# Patient Record
Sex: MALE | Race: WHITE
[De-identification: names, ages, dates, MRNs, and addresses within clinical notes are randomized per-mention and may not be internally consistent; named-entity substitution may affect disease eponyms.]

---

## 2024-01-16 ENCOUNTER — APPOINTMENT (OUTPATIENT)
Dept: PHYSICAL MEDICINE AND REHAB | Facility: CLINIC | Age: 74
End: 2024-01-16
Payer: COMMERCIAL

## 2024-01-16 VITALS
SYSTOLIC BLOOD PRESSURE: 102 MMHG | WEIGHT: 163 LBS | DIASTOLIC BLOOD PRESSURE: 65 MMHG | BODY MASS INDEX: 24.71 KG/M2 | RESPIRATION RATE: 18 BRPM | HEIGHT: 68 IN | HEART RATE: 71 BPM

## 2024-01-16 DIAGNOSIS — M79.646 PAIN IN UNSPECIFIED FINGER(S): ICD-10-CM

## 2024-01-16 DIAGNOSIS — R27.8 OTHER LACK OF COORDINATION: ICD-10-CM

## 2024-01-16 DIAGNOSIS — R29.898 OTHER SYMPTOMS AND SIGNS INVOLVING THE MUSCULOSKELETAL SYSTEM: ICD-10-CM

## 2024-01-16 DIAGNOSIS — Z78.9 OTHER SPECIFIED HEALTH STATUS: ICD-10-CM

## 2024-01-16 PROCEDURE — 99204 OFFICE O/P NEW MOD 45 MIN: CPT

## 2024-01-16 RX ORDER — SILDENAFIL CITRATE 100 MG/1
100 TABLET, FILM COATED ORAL
Refills: 0 | Status: ACTIVE | COMMUNITY

## 2024-01-16 RX ORDER — FINASTERIDE 1 MG/1
TABLET ORAL
Refills: 0 | Status: ACTIVE | COMMUNITY

## 2024-01-16 RX ORDER — TERAZOSIN HCL 10 MG
CAPSULE ORAL
Refills: 0 | Status: ACTIVE | COMMUNITY

## 2024-01-16 NOTE — ASSESSMENT
[FreeTextEntry1] :   PLAN AND RECOMMENDATIONS :   We discussed differential diagnosis and clinical impression  agree with EMG rule out ulnar neuropathy or CTS  Recommend .symptomatic care and support  medications NA   imaging done by ortho    hydrotherapy /heat / cold for pain  continue  ergonomic precautions including pacing ,posture and frequent breaks while typing.  relative rest and avoidance of painful activity where possible  increasing activity as discussed  return for EMG  Information given to patient about EMG and Nerve Conduction Study Examination including  planning, differential diagnosis to rule in /rule out ,duration of the test ,precautions (if patient on blood thinner.has bleeding disorder or  pace maker device etc -still possible to undergo with care), side effects(benign-limited to short term bruising and discomfort/pain)   The protocol of temp checks upon arrival ,disinfection procedure of waiting room and the lab explained- reassured.  All questions answered.  Patient instructed to book appointment upon conclusion of appointment  Information sheet ' Answers to your Questions on EMG " forwarded to patient to read prior to testing, with further information about training,background and the procedure itself .

## 2024-01-16 NOTE — HISTORY OF PRESENT ILLNESS
[FreeTextEntry1] :  Mr. CE MELLO is a very pleasant [facility] 73-year male who seen for evaluation of R thumb that has been ongoing for 1 month without any specific injury or inciting event. The pain is located primarily R thumb finger intermittent in nature and described as weak. The pain is rated as 0/10 during today's visit, and ranges from 0/10 - he doesn't feel any pain, fingers  just weak and lack power. The patient's symptoms are aggravated by bending and alleviated by nothing. The patient denies any night pain, numbness/tingling, weakness, or bowel/bladder dysfunction. The patient has no other complaints at this time. he is a practicing  transactional full time  has no neck pain  he cannot unscrew jars

## 2024-01-16 NOTE — PHYSICAL EXAM
[FreeTextEntry1] : PHYSICAL EXAM : OBJECTIVE   GENERAL : Awake ,alert and oriented to time place and person  HEAD : normocephalic and atraumatic  NECK : supple ,no tracheal deviation ,no thyroid enlargement noted with swallowing EYES : sclera and conjunctiva normal no redness,intact extraocular movements  ENT  : ears and nose normal in appearance -hearing adequate  PULMONARY: effort normal. No respiratory distress. breathing regular. No wheezes  LYMPH : No swelling in limbs, capillary return within normal range  CVS : warm extremities,no palpitations,not short of breath, no visible jugular venous distention PSYCH : mood and affect normal ,good eye contact ,normal attention  ABDOMEN : no visible distension ,  NEUROLOGICAL:cranial nerves intact,muscle tone normal,gait and balance safe except where noted below  SKIN : warm and dry No rash detected over specific body areas examined  MUSCULOSKELETAL: normal muscle bulk, no focal bony tenderness /posture normal except where specified below  grasp 5/5  weak PAD and DAB  no wasting   cspine ROM full  No long tract signs found on clinical exam and no focal neurological deficits deltoid biceps triceps wrist extensors 5/5 sensation u\intact

## 2024-01-16 NOTE — CONSULT LETTER
[FreeTextEntry1] : Dear Dr. AUGUSTA OLVERA  ,   I had the pleasure of evaluating your patient, CE MELLO .  Thank you very much for allowing me to participate in the care of this patient. If you have any questions, please do not hesitate to contact me.  Nice to hear of you and that any [facility] doctor still in practice  cheers  Sincerely,  Param Phan MD  my cell is   ABPMR Board Certified in Physical Medicine and Rehabilitation Certified Fellow of AANEM (Neuromuscular and Electrodiagnostic Medicine) Subspecialty certified in Sports Medicine (ABPMR)   of Physical Medicine and Rehabilitation [facility] [employer] [facility]

## 2024-01-16 NOTE — REASON FOR VISIT
[Initial Evaluation] : an initial evaluation [FreeTextEntry1] : for R thumb referred by Dr. Gutierrez

## 2024-01-16 NOTE — REVIEW OF SYSTEMS
[Patient Intake Form Reviewed] : Patient intake form was reviewed [Fever] : no fever [Chills] : no chills [Joint Pain] : no joint pain [Muscle Weakness] : muscle weakness

## 2024-01-23 ENCOUNTER — APPOINTMENT (OUTPATIENT)
Dept: PHYSICAL MEDICINE AND REHAB | Facility: CLINIC | Age: 74
End: 2024-01-23
Payer: COMMERCIAL

## 2024-01-23 DIAGNOSIS — G56.03 CARPAL TUNNEL SYNDROM,BILATERAL UPPER LIMBS: ICD-10-CM

## 2024-01-23 PROCEDURE — 95886 MUSC TEST DONE W/N TEST COMP: CPT

## 2024-01-23 PROCEDURE — 95911 NRV CNDJ TEST 9-10 STUDIES: CPT

## 2024-01-23 NOTE — PROCEDURE
[de-identified] : EMG /NERVE CONDUCTION STUDY performed today without complication  Tabulated data, wave forms , conclusions and recommendations are attached and in the procedure report.  Please refer to the scanned study attached to this encounter  Full history and focused clinical exam performed prior to the examination and documented in report

## 2024-04-02 ENCOUNTER — APPOINTMENT (OUTPATIENT)
Dept: UROLOGY | Facility: CLINIC | Age: 74
End: 2024-04-02
Payer: COMMERCIAL

## 2024-04-02 VITALS
SYSTOLIC BLOOD PRESSURE: 104 MMHG | DIASTOLIC BLOOD PRESSURE: 65 MMHG | OXYGEN SATURATION: 96 % | HEART RATE: 73 BPM | TEMPERATURE: 97.5 F

## 2024-04-02 DIAGNOSIS — R35.0 FREQUENCY OF MICTURITION: ICD-10-CM

## 2024-04-02 PROCEDURE — 99204 OFFICE O/P NEW MOD 45 MIN: CPT

## 2024-04-02 RX ORDER — SILDENAFIL 100 MG/1
100 TABLET, FILM COATED ORAL
Qty: 6 | Refills: 4 | Status: ACTIVE | COMMUNITY
Start: 2024-04-02 | End: 1900-01-01

## 2024-04-02 RX ORDER — SILODOSIN 8 MG/1
8 CAPSULE ORAL DAILY
Qty: 90 | Refills: 3 | Status: ACTIVE | COMMUNITY
Start: 2024-04-02 | End: 1900-01-01

## 2024-04-02 NOTE — HISTORY OF PRESENT ILLNESS
[FreeTextEntry1] : Language: English Date of First visit: 2024 Accompanied by: Self Contact info: *** Referring Provider/PCP: Tony Garrison [phone]        CC/ Problem List:   =============================================================================== FIRST VISIT: The patient has been a private practice patient of Dr. Fred Sparrow Prior paper chart WAS available for this visit. Any Prior paper chart is scanned into the fitaborate system. Please see admin   The patient's first visit with [facility] urology was on 2024 when the patient was 73 years old   He has been seen by Dr. Sparrow since . He first c/o urinary frequency, weak stream, difficulty with erections. He was put on Hytrin 2mg. In  he was also given Yohimbine and Hytrin 4mg. He had a prostate nodule in 1998 and recommended a Pbx. He was then put on Viagra in . By  he was on Hytrin 4mg, Viagra 50mg and Yohimbine. In  he c/o low libido and was given T in the office 1.5cc. He was switched to Levitra in . In 2005 T was stopped because the pt was not sexually active. His prostate was deemed to be 30gm (unclear method) but he was put on Avodart in . His Hytrin was increased to 10mg but his Avodart was stopped . He had a UTI in . He was kept on Hytrin and Viagra. Around the same time he stopped Taking Viagra. In  finasteride was added.   ------------------------------------------------------------------------------------------- INTERVAL VISITS:   The patient's age today 2024  is 73 year old. Please note interval events and changes in PMH, PSH, MEDS and ALLERGIES were reviewed. He has some frequency. It is not worse. It is a bothersome. He denies straining to void. HIs stream is weak. He has nocturia 2-3x/night. He does not feel empty after voiding. He has some mild constipation. He denies SE's with Hytrin. He has low BP so he has to be careful with the Viagra. He knows when it is interacting and wants to continue it.   ===============================================================================   PMH: Right thumb, Skin problem PSH: Right knee MCL and meniscus, tonsils; Varicocele POBH: (if applicable) FH:   ALL: Sulfa- last had it with 3yo MEDS: Hytrin 10mg, Finasteride 5mg, Viagra, Elidel, No vitamins SOC: No Tob, Social ETOH, no drugs     ROS: Review of Systems is as per HPI unless otherwise denoted below     =============================================================================== DATA:   LABS:-------------------------------------------------------------------------------------------------------------------  All PSAs from [id number] were <1 2007: T done at 1:30pm: T 230, Free T 5.8 2013: PSA 1.1 2015: PSA 1.1 2017: PSA 1.2 2019: PSA 0.6 2020: PSA 0.6 10/8/2022: PSA 0.6 2017: Urine enterococcus R: Tet     RADS:-------------------------------------------------------------------------------------------------------------------  2021: RBUS Kidneys normal. RLP cyst 3cm Nonshadowing echogenic focus in left kidney 6mm Bladder normal with BL jets, PVR 80cc, Prostate 24gm     PATHOLOGY/CYTOLOGY:-------------------------------------------------------------------------------------------  1998: Prostate biopsy Five cores of prostatic adenomyomatous Hyperplasia     VOIDING STUDIES: ----------------------------------------------------------------------------------------------------  2024: PVR 112cc     STONE STUDIES: (Analysis/LLSA)----------------------------------------------------------------------------------       PROCEDURES: -----------------------------------------------------------------------------------------------         ===============================================================================    PHYSICAL EXAM:  GEN: AAOx3, NAD; Habitus: normal  BARRIERS to CARE: none  PSYCH: Appropriate Behavior, Affect Congruent  HEENT: AT/NC Trachea midline. EOMI.  Lungs: No labored breathing.  NEURO: + Movement, all 4 extremities grossly intact without deficits. No tremors.  SKIN: Warm dry. No visible rashes or ulcers  GAIT: Gait normal, Stability good  =======================================================================================      ASSESSMENT and PLAN   The patient is a 73 year male with a history of the followin. BPH with some frequency, moderate PVR, and ED. He Gets faint on occasion with Viagra and Hytrin 10mg. He likes using the Viagra. He still has frequency. He tends toward hypotension I proposed the following plan for him: a. Change his hytrin to Silodosin which is safe for people with sulfa allergies b. Two weeks later stop finasteride. This likely isn't helping given his small prostate and may be contributing to his ED c. Call us with any issues. Use with caution with Viagra (refilled) d. When he comes back from [place] consider switching PRN Viagra to Cialis daily He understood and agreed to the above.  2. He states Dr. Garrison has been checking his PSA and it is fine. We have not received anything from Dr. Garrison.       -----------------------------------------------------------------------------------------------------   LABS/TESTS Ordered:   Meds Ordered: Switch Hytrin to Silodosin 8mg, use with caution with Viagra 50mg po QD PRN   Follow up: Sept/Oct   -----------------------------------------------------------------------------------------------------  The total amount of time I have personally spent preparing for this visit, reviewing the patient's test results, obtaining external history, ordering tests/medications, documenting clinical information, communicating with and counseling the patient/family and/or caregiver(s), and spent face to face with the patient explaining the above was 45 minutes.  Thank you for allowing me to participate in your patient's care. Please feel free to contact me with any questions.   Effie Taveras MD [facility] Department of Urology   [address] [place] [id number] P: [phone]; F: [phone]

## 2024-07-17 ENCOUNTER — NON-APPOINTMENT (OUTPATIENT)
Age: 74
End: 2024-07-17